# Patient Record
Sex: FEMALE | Race: OTHER
[De-identification: names, ages, dates, MRNs, and addresses within clinical notes are randomized per-mention and may not be internally consistent; named-entity substitution may affect disease eponyms.]

---

## 2020-10-14 ENCOUNTER — HOSPITAL ENCOUNTER (EMERGENCY)
Dept: HOSPITAL 74 - JERFT | Age: 25
Discharge: HOME | End: 2020-10-14
Payer: COMMERCIAL

## 2020-10-14 VITALS — BODY MASS INDEX: 25.8 KG/M2

## 2020-10-14 VITALS — DIASTOLIC BLOOD PRESSURE: 68 MMHG | HEART RATE: 71 BPM | SYSTOLIC BLOOD PRESSURE: 115 MMHG | TEMPERATURE: 98.1 F

## 2020-10-14 DIAGNOSIS — S90.02XA: ICD-10-CM

## 2020-10-14 DIAGNOSIS — S16.1XXA: Primary | ICD-10-CM
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
## 2020-10-14 NOTE — XMS
Summarization Of Episode

                           Created on:2020



Patient:DORINA RUCKER

Sex:Female

:1995

External Reference #:[id number]





Demographics







                          Address                   [address]



                                                    [address]



                                                    [place] [address]

 

                          Home Phone                [phone]

 

                          Mobile Phone              [phone]

 

                          Preferred Language        English

 

                          Marital Status            Not  or 

 

                          Rastafari Affiliation     Sikhism

 

                          Race                      OT

 

                          Ethnic Group              Not  or 









Author







                          Organization              [facility]









Support







                Name            Relationship    Address         Phone

 

                SE              Unavailable     Unavailable     Unavailable

 

                GAUTAM RUCKER GRANDFATHER     [address]  [phone]



                                                [address]         



                                                [place] [address] 

 

                Helder Rucker Unavailable     [address] [place] [address] Unavailable



                                                [place] [address] 









Re-disclosure Warning

The records that you are about to access may contain information from federally-
assisted alcohol or drug abuse programs. If such information is present, then 
the following federally mandated warning applies: This information has been 
disclosed to you from records protected by federal confidentiality rules (42 CFR
part 2). The federal rules prohibit you from making any further disclosure of 
this information unless further disclosure is expressly permitted by the written
consent of the person to whom it pertains or as otherwise permitted by 42 CFR 
part 2. A general authorization for the release of medical or other information 
is NOT sufficient for this purpose. The Federal rules restrict any use of the 
information to criminally investigate or prosecute any alcohol or drug abuse 
patient.The records that you are about to access may contain highly sensitive 
health information, the redisclosure of which is protected by Article 27-F of 
the [facility] Public Health law. If you continue you may haveaccess to 
information: Regarding HIV / AIDS; Provided by facilities licensed or operated 
by the [facility] Office of Mental Health; or Provided by the [facility]
Office for People With Developmental Disabilities. If such information is 
present, then the following [place] mandated warning applies: This 
information has been disclosed to you from confidential records which are 
protected by state law. State law prohibits you from making any further 
disclosure of this information without the specific written consent of the 
person to whom it pertains, or as otherwise permitted by law. Any unauthorized 
further disclosure in violation of state law may result in a fine or long term 
sentence or both. A general authorization for the release of medical or other 
information is NOT sufficient authorization for further disclosure.



Insurance Providers







          Payer name Policy type Policy ID Covered   Covered party's Policy    P

[place]



                    / Coverage           party ID  relationship to [employer]    Inf

ormation



                    type                          mathews              

 

          [employer]                [id number]           [place]                  [id number]



          MEDICAID                                                    



          COMM PLAN                                                   

 

          PENDING             [id number]           [place]                  [id number]



          WC/NF ONLY                                                   







Results







                    ID                  Date                Data Source

 

                    [id number]            2020 10:03:00 PM EDT [facility]

tics











          Name      Value     Range     Interpretation Code Description Data [place]

rce(s) Supporting



                                                                      Document(s

)

 

          COV2                                              [facility] 









                                        This lab was ordered by TRENA LEWIS

 and reported by [facility].











                                        Procedure

## 2023-03-01 PROBLEM — Z00.00 ENCOUNTER FOR PREVENTIVE HEALTH EXAMINATION: Status: ACTIVE | Noted: 2023-03-01

## 2023-03-08 ENCOUNTER — APPOINTMENT (OUTPATIENT)
Dept: NEUROSURGERY | Facility: CLINIC | Age: 28
End: 2023-03-08